# Patient Record
Sex: FEMALE | Race: WHITE | Employment: FULL TIME | URBAN - METROPOLITAN AREA
[De-identification: names, ages, dates, MRNs, and addresses within clinical notes are randomized per-mention and may not be internally consistent; named-entity substitution may affect disease eponyms.]

---

## 2017-05-25 ENCOUNTER — TELEPHONE (OUTPATIENT)
Dept: ONCOLOGY | Facility: CLINIC | Age: 67
End: 2017-05-25

## 2017-05-25 NOTE — TELEPHONE ENCOUNTER
SOCIAL WORK  [facility] referral sent for 6/18-21 along with caregiver exemption -  Chelsea Ruiz, [facility]  [phone]

## 2017-10-03 ENCOUNTER — ONCOLOGY VISIT (OUTPATIENT)
Dept: ONCOLOGY | Facility: CLINIC | Age: 67
End: 2017-10-03
Attending: INTERNAL MEDICINE
Payer: COMMERCIAL

## 2017-10-03 VITALS
RESPIRATION RATE: 18 BRPM | OXYGEN SATURATION: 96 % | SYSTOLIC BLOOD PRESSURE: 115 MMHG | DIASTOLIC BLOOD PRESSURE: 78 MMHG | BODY MASS INDEX: 27.25 KG/M2 | WEIGHT: 156.31 LBS | HEART RATE: 78 BPM | TEMPERATURE: 98 F

## 2017-10-03 DIAGNOSIS — C49.9 SARCOMA (H): ICD-10-CM

## 2017-10-03 DIAGNOSIS — R91.8 PULMONARY NODULES: Primary | ICD-10-CM

## 2017-10-03 DIAGNOSIS — F43.21 ADJUSTMENT DISORDER WITH DEPRESSED MOOD: ICD-10-CM

## 2017-10-03 DIAGNOSIS — J44.9 CHRONIC OBSTRUCTIVE PULMONARY DISEASE, UNSPECIFIED COPD TYPE (H): ICD-10-CM

## 2017-10-03 PROCEDURE — 99214 OFFICE O/P EST MOD 30 MIN: CPT | Mod: GC | Performed by: INTERNAL MEDICINE

## 2017-10-03 PROCEDURE — 99212 OFFICE O/P EST SF 10 MIN: CPT | Mod: ZF

## 2017-10-03 RX ORDER — AMOXICILLIN AND CLAVULANATE POTASSIUM 500; 125 MG/1; MG/1
1 TABLET, FILM COATED ORAL 2 TIMES DAILY
Qty: 20 TABLET | Refills: 0 | Status: SHIPPED | OUTPATIENT
Start: 2017-10-03

## 2017-10-03 ASSESSMENT — PAIN SCALES - GENERAL: PAINLEVEL: NO PAIN (0)

## 2017-10-03 NOTE — LETTER
10/3/2017       RE: Apolonia Sánchez  [address] [place] [id number]     Dear Colleague,    Thank you for referring your patient, Apolonia Sánchez, to the [facility]. Please see a copy of my visit note below.    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   -  Background   In brief, she was generally doing well until she noticed some discomfort in the right buttock probably about 09/2010. This was not very bothersome and she did not make much out of it. Later, however, she had a fairly rapid onset of significant pain there and felt she noticed a lump and sought medical attention. 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   She began pre-operative chemotherapy w/ doxil/ifos on 3-4-11.   She had 4 cycles as an inpatient and the doxil dose was reduced due to skin toxiticy.   She was admitted with a tooth infection after the last (3rd) cycle.   Surgery was June 2011 and about 5% of remaining tumor was viable.  She finished XRT in Sept 2011.  -    Interval history:    Overall, Ms Sánchez reports feeling well. She has recently noted some increased cough with sputum production. She has not had any fevers, increased dyspnea, chest pain or other symptoms.   She able to walk up/down a floor of stairs without stopping but has to rest at the top; she uses a combivent inhaler and regulates her PCP regarding.    She continues to work as an  at a care facility.  She had a CXR completed yesterday (10/2/2017). This showed a suspicious nodule in the RUL. Further evaluation with a CT scan was recommended.     Her son passed away in 2015 while on hospice and then her daughter-in-law's mother suddenly passed away from an aneurysm, as a result.  We discussed this again today as it remains a stress.  Her energy level is good.  Appetite is fine.    She thinks her mood is usually OK on current zoloft of 100 and feels better than last  year; she finds working is helpful for her mood.  She denies any changes in her medications.  Her 10-point review of systems is otherwise unremarkable.      -  Background PMH, FH, SH   PMH unremarkable other than foot surgery in 1994 and a three normal deliveries.   FH notable for breast cancer in her mother and heart disease in both parents.   She denies any drug allergies.   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 She stopped smoking a few years ago having smoked from ages 20 - 60 and still has an occasional cigarette. She does not abuse alcohol or other drugs.   -  On exam she appeared comfortable.  /78  Pulse 78  Temp 98  F (36.7  C) (Oral)  Resp 18  Wt 70.9 kg (156 lb 4.9 oz)  SpO2 96%  BMI 27.25 kg/m2  Gen NAD  HEENT 3 mm mobile soft nodule right chin, nontender cyst (this was removed but not all). No icterus  NECK No thyromegaly.  CV RRR  Pulm dec BS throughout. No wheeze  LYMPH No lymphadenopathy  Abd soft, ntnd, nl BS, no mass  Ext no edema  Skin well healed incision R buttock/thigh. No rashes/lesions noted  Romberg, heel/toe walk normal.  Mood good.    -  No labs.  -  Imaging:  CXR demonstrated nodule in the RUL. A follow up chest CT was ordered today. We reviewed the images. The official read has not yet returned. However, there appears to be a spiculated nodule measuring just over 1 cm in the RUL. This could represent infection vs primary lung malignancy vs metastatic sarcoma.   -    We reviewed the results from the CXR and ordered a chest CT. She returned later in the day following her CT scan. At this time, we cannot be sure as to the etiology of the nodule. She has been having some increased cough with sputum production recently. Thus, it is possible that this could be infectious. However, this may also be either primary malignancy or metastatic disease.   We will start augmentin. She will return for CT scan in 1 month with follow up.     John Hoffman MD  Resident,  Radiation Oncology     I examined the patient w Dr. Hoffman and agree w the above note.    Aren Drew M.D.  Professor  Hematology, Oncology and Transplantation

## 2017-10-03 NOTE — MR AVS SNAPSHOT
After Visit Summary   10/3/2017    Apolonia Sánchez    MRN: [id number]           Patient Information     Date Of Birth          1950        Visit Information        Provider Department      10/3/2017 8:30 AM Aren Drew MD [facility]        Today's Diagnoses     Pulmonary nodules    -  1    Sarcoma (H)        Adjustment disorder with depressed mood        Chronic obstructive pulmonary disease, unspecified COPD type (H)           Follow-ups after your visit        Your next 10 appointments already scheduled     Oct 31, 2017  1:00 PM CDT   (Arrive by 12:45 PM)   CT CHEST W/O CONTRAST with UCCT2   [facility] ([facility])    [address] [place] [id number] [phone]           Please bring any scans or X-rays taken at other hospitals, if similar tests were done. Also bring a list of your medicines, including vitamins, minerals and over-the-counter drugs. It is safest to leave personal items at home.  Be sure to tell your doctor:   If you have any allergies.   If there s any chance you are pregnant.   If you are breastfeeding.   If you have any special needs.  You do not need to do anything special to prepare.  Please wear loose clothing, such as a sweat suit or jogging clothes. Avoid snaps, zippers and other metal. We may ask you to undress and put on a hospital gown.            Nov 01, 2017 12:30 PM CDT   (Arrive by 12:15 PM)   Return Visit with Aren Drew MD   [facility] ([facility])    [address] [place] [id number] [phone]              Future tests that were ordered for you today     Open Future Orders        Priority Expected Expires Ordered    CT Chest w/o Contrast Routine 10/31/2017 1/1/2018 10/3/2017            Who to contact     If you have questions or need follow up information about today's clinic  visit or your schedule please contact [facility] directly at [phone].  Normal or non-critical lab and imaging results will be communicated to you by MyChart, letter or phone within 4 business days after the clinic has received the results. If you do not hear from us within 7 days, please contact the clinic through [facility] or phone. If you have a critical or abnormal lab result, we will notify you by phone as soon as possible.  Submit refill requests through [employer] or call your pharmacy and they will forward the refill request to us. Please allow 3 business days for your refill to be completed.          Additional Information About Your Visit        [employer] Information     [employer] gives you secure access to your electronic health record. If you see a primary care provider, you can also send messages to your care team and make appointments. If you have questions, please call your primary care clinic.  If you do not have a primary care provider, please call [phone] and they will assist you.        Care EveryWhere ID     This is your Care EveryWhere ID. This could be used by other organizations to access your [facility] medical records  [id number]        Your Vitals Were     Pulse Temperature Respirations Pulse Oximetry BMI (Body Mass Index)       78 98  F (36.7  C) (Oral) 18 96% 27.25 kg/m2        Blood Pressure from Last 3 Encounters:   10/03/17 115/78   06/21/16 110/81   01/13/15 117/81    Weight from Last 3 Encounters:   10/03/17 70.9 kg (156 lb 4.9 oz)   06/21/16 71.7 kg (158 lb)   01/13/15 70.9 kg (156 lb 4.9 oz)                 Today's Medication Changes          These changes are accurate as of: 10/3/17  9:56 AM.  If you have any questions, ask your nurse or doctor.               Start taking these medicines.        Dose/Directions    amoxicillin-clavulanate 500-125 MG per tablet   Commonly known as:  AUGMENTIN   Used for:  Pulmonary nodules, Chronic obstructive pulmonary disease,  unspecified COPD type (H), Sarcoma (H), Adjustment disorder with depressed mood   Started by:  Aren Drew MD        Dose:  1 tablet   Take 1 tablet by mouth 2 times daily   Quantity:  20 tablet   Refills:  0         Stop taking these medicines if you haven't already. Please contact your care team if you have questions.     aspirin 81 MG tablet   Stopped by:  Aren Drew MD           LEVOFLOXACIN PO   Stopped by:  Aren Drew MD                Where to get your medicines      These medications were sent to [facility] [id number] [place] [address] [address] AT [facility] [address]  [address], [facility] [address]     Phone:  [phone]     amoxicillin-clavulanate 500-125 MG per tablet                Primary Care Provider Office Phone # Fax #    Regina Dia [phone]       [address] [place] [address]        Equal Access to Services     [facility]: Hadii aad ku hadasho Soomaali, waaxda luqadaha, qaybta kaalmada adeegyada, waxay idiin haylenka stephenson . So [facility] [phone].    ATENCIÓN: Si habla español, tiene a bailey disposición servicios gratuitos de asistencia lingüística. [facility] [phone].    We comply with applicable federal civil rights laws and [place] laws. We do not discriminate on the basis of race, color, national origin, age, disability, sex, sexual orientation, or gender identity.            Thank you!     Thank you for choosing [facility]  for your care. Our goal is always to provide you with excellent care. Hearing back from our patients is one way we can continue to improve our services. Please take a few minutes to complete the written survey that you may receive in the mail after your visit with us. Thank you!             Your Updated Medication List - Protect others around you: Learn how to safely use, store and throw away your  medicines at www.disposemymeds.org.          This list is accurate as of: 10/3/17  9:56 AM.  Always use your most recent med list.                   Brand Name Dispense Instructions for use Diagnosis    amoxicillin-clavulanate 500-125 MG per tablet    AUGMENTIN    20 tablet    Take 1 tablet by mouth 2 times daily    Pulmonary nodules, Chronic obstructive pulmonary disease, unspecified COPD type (H), Sarcoma (H), Adjustment disorder with depressed mood       COMBIVENT RESPIMAT  MCG/ACT inhaler   Generic drug:  Ipratropium-Albuterol      Inhale 1 puff into the lungs as needed for shortness of breath / dyspnea or wheezing    Sarcoma (H)       LIPITOR PO      Take 1 tablet by mouth daily.        LISINOPRIL PO      Take 12.5 mg by mouth    Sarcoma (H), Adjustment disorder with depressed mood       PRAVASTATIN SODIUM PO      Take 80 mg by mouth    Sarcoma (H), Adjustment disorder with depressed mood       sertraline 100 MG tablet    ZOLOFT     Take 100 mg by mouth daily.        TYLENOL PM EXTRA STRENGTH PO      Take  by mouth. Take 1 tab at bedtime

## 2017-10-03 NOTE — PROGRESS NOTES
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   -  Background   In brief, she was generally doing well until she noticed some discomfort in the right buttock probably about 09/2010. This was not very bothersome and she did not make much out of it. Later, however, she had a fairly rapid onset of significant pain there and felt she noticed a lump and sought medical attention. 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   She began pre-operative chemotherapy w/ doxil/ifos on 3-4-11.   She had 4 cycles as an inpatient and the doxil dose was reduced due to skin toxiticy.   She was admitted with a tooth infection after the last (3rd) cycle.   Surgery was June 2011 and about 5% of remaining tumor was viable.  She finished XRT in Sept 2011.  -    Interval history:    Overall, Ms Sánchez reports feeling well. She has recently noted some increased cough with sputum production. She has not had any fevers, increased dyspnea, chest pain or other symptoms.   She able to walk up/down a floor of stairs without stopping but has to rest at the top; she uses a combivent inhaler and regulates her PCP regarding.    She continues to work as an  at a care facility.  She had a CXR completed yesterday (10/2/2017). This showed a suspicious nodule in the RUL. Further evaluation with a CT scan was recommended.     Her son passed away in 2015 while on hospice and then her daughter-in-law's mother suddenly passed away from an aneurysm, as a result.  We discussed this again today as it remains a stress.  Her energy level is good.  Appetite is fine.    She thinks her mood is usually OK on current zoloft of 100 and feels better than last year; she finds working is helpful for her mood.  She denies any changes in her medications.  Her 10-point review of systems is otherwise unremarkable.      -  Background PMH, FH, SH   PMH unremarkable other than foot surgery in 1994  and a three normal deliveries.   FH notable for breast cancer in her mother and heart disease in both parents.   She denies any drug allergies.   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 She stopped smoking a few years ago having smoked from ages 20 - 60 and still has an occasional cigarette. She does not abuse alcohol or other drugs.   -  On exam she appeared comfortable.  /78  Pulse 78  Temp 98  F (36.7  C) (Oral)  Resp 18  Wt 70.9 kg (156 lb 4.9 oz)  SpO2 96%  BMI 27.25 kg/m2  Gen NAD  HEENT 3 mm mobile soft nodule right chin, nontender cyst (this was removed but not all). No icterus  NECK No thyromegaly.  CV RRR  Pulm dec BS throughout. No wheeze  LYMPH No lymphadenopathy  Abd soft, ntnd, nl BS, no mass  Ext no edema  Skin well healed incision R buttock/thigh. No rashes/lesions noted  Romberg, heel/toe walk normal.  Mood good.    -  No labs.  -  Imaging:  CXR demonstrated nodule in the RUL. A follow up chest CT was ordered today. We reviewed the images. The official read has not yet returned. However, there appears to be a spiculated nodule measuring just over 1 cm in the RUL. This could represent infection vs primary lung malignancy vs metastatic sarcoma.   -    We reviewed the results from the CXR and ordered a chest CT. She returned later in the day following her CT scan. At this time, we cannot be sure as to the etiology of the nodule. She has been having some increased cough with sputum production recently. Thus, it is possible that this could be infectious. However, this may also be either primary malignancy or metastatic disease.   We will start augmentin. She will return for CT scan in 1 month with follow up.     John Hoffman MD  Resident, Radiation Oncology     I examined the patient w Dr. Hoffman and agree w the above note.    Aren Drew M.D.  Professor  Hematology, Oncology and Transplantation

## 2017-10-03 NOTE — NURSING NOTE
"Oncology Rooming Note    October 3, 2017 8:17 AM   Apolonia Sánchez is a 66 year old female who presents for:    Chief Complaint   Patient presents with     Oncology Clinic Visit     Return for Sarcoma      Initial Vitals: /78  Pulse 78  Temp 98  F (36.7  C) (Oral)  Resp 18  Wt 70.9 kg (156 lb 4.9 oz)  SpO2 96%  BMI 27.25 kg/m2 Estimated body mass index is 27.25 kg/(m^2) as calculated from the following:    Height as of 8/12/14: 1.613 m (5' 3.5\").    Weight as of this encounter: 70.9 kg (156 lb 4.9 oz). Body surface area is 1.78 meters squared.  No Pain (0) Comment: Data Unavailable   No LMP recorded. Patient is postmenopausal.  Allergies reviewed: Yes  Medications reviewed: Yes    Medications: Medication refills not needed today.  Pharmacy name entered into [facility]:    [facility] - [place] [address] AT [facility] [address]  [facility] - [place], [place] - [address] [place] [address]    Clinical concerns: results  Viki was notified.    8 minutes for nursing intake (face to face time)     Sally Hanley MA              "

## 2017-11-01 ENCOUNTER — ONCOLOGY VISIT (OUTPATIENT)
Dept: ONCOLOGY | Facility: CLINIC | Age: 67
End: 2017-11-01
Attending: INTERNAL MEDICINE
Payer: COMMERCIAL

## 2017-11-01 VITALS
SYSTOLIC BLOOD PRESSURE: 124 MMHG | HEIGHT: 64 IN | OXYGEN SATURATION: 95 % | HEART RATE: 96 BPM | DIASTOLIC BLOOD PRESSURE: 84 MMHG | RESPIRATION RATE: 18 BRPM | TEMPERATURE: 97.3 F | BODY MASS INDEX: 26.8 KG/M2 | WEIGHT: 157 LBS

## 2017-11-01 DIAGNOSIS — J98.4 DISEASE OF LUNG: ICD-10-CM

## 2017-11-01 DIAGNOSIS — C49.9 SARCOMA (H): ICD-10-CM

## 2017-11-01 DIAGNOSIS — R91.8 PULMONARY NODULES: ICD-10-CM

## 2017-11-01 DIAGNOSIS — F43.21 ADJUSTMENT DISORDER WITH DEPRESSED MOOD: ICD-10-CM

## 2017-11-01 DIAGNOSIS — J44.9 CHRONIC OBSTRUCTIVE PULMONARY DISEASE, UNSPECIFIED COPD TYPE (H): ICD-10-CM

## 2017-11-01 PROCEDURE — 99213 OFFICE O/P EST LOW 20 MIN: CPT | Mod: ZF

## 2017-11-01 PROCEDURE — 99214 OFFICE O/P EST MOD 30 MIN: CPT | Mod: ZP | Performed by: INTERNAL MEDICINE

## 2017-11-01 ASSESSMENT — PAIN SCALES - GENERAL: PAINLEVEL: NO PAIN (0)

## 2017-11-01 NOTE — LETTER
11/1/2017       RE: Apolonia Sánchez  [address] [place] [id number]     Dear Colleague,    Thank you for referring your patient, Apolonia Sánchez, to the [facility]. Please see a copy of my visit note below.    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   -  Background   In brief, she was generally doing well until she noticed some discomfort in the right buttock probably about 09/2010. This was not very bothersome and she did not make much out of it. Later, however, she had a fairly rapid onset of significant pain there and felt she noticed a lump and sought medical attention. 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   She began pre-operative chemotherapy w/ doxil/ifos on 3-4-11.   She had 4 cycles as an inpatient and the doxil dose was reduced due to skin toxiticy.   She was admitted with a tooth infection after the last (3rd) cycle.   Surgery was June 2011 and about 5% of remaining tumor was viable.  She finished XRT in Sept 2011.  -     Interval history:     Overall, Ms Sánchez reports feeling well. She took some augmentin and the cough has resolved. She has not had any fevers, increased dyspnea, chest pain or other symptoms.   She able to walk up/down a floor of stairs without stopping but has to rest at the top; she uses a combivent inhaler per her PCP.    She continues to work as an  at a care facility.    The CT a month ago showed a suspicious nodule in the RUL.      Her son passed away in 2015 while on hospice and then her daughter-in-law's mother suddenly passed away from an aneurysm, as a result.  We discussed this again today as it remains a stress.     She thinks her mood is usually OK on current zoloft of 100 and feels better than last year; she finds working is helpful for her mood.  She denies any changes in her medications.    Her 10-point review of systems is otherwise unremarkable.  "      -  Background PMH, FH, SH   PMH unremarkable other than foot surgery in 1994 and a three normal deliveries.   FH notable for breast cancer in her mother and heart disease in both parents.   She denies any drug allergies.   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 She stopped smoking a few years ago having smoked from ages 20 - 60 and still has an occasional cigarette. She does not abuse alcohol or other drugs.   -  On exam she appeared comfortable.  /84  Pulse 96  Temp 97.3  F (36.3  C) (Oral)  Resp 18  Ht 1.613 m (5' 3.5\")  Wt 71.2 kg (157 lb)  SpO2 95%  BMI 27.37 kg/m2  HEENT  No icterus  NECK No thyromegaly.  CV RRR  CHEST dec BS throughout. No wheeze  ABD soft, ntnd, nl BS, no mass  Ext no edema  LYMPH No lymphadenopathy  SKIN well healed incision R buttock/thigh. No rashes/lesions noted  NEURO Romberg, heel/toe walk normal.  Mood anxious today.     -  No labs.  -  I reviewed the new images. I reviewed the images w Dr Vigil and the spiculated nodule measuring just over 1 cm in the RUL. This could represent infection vs primary lung malignancy vs metastatic sarcoma. It us unchanged over the last month.  -     We discussed that we cannot be sure as to the etiology of the nodule. We will see her again about 2-1 with a CT. She will call ifother questions arise.     Aren Drew M.D.  Professor  Hematology, Oncology and Transplantation      "

## 2017-11-01 NOTE — NURSING NOTE
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    Weight as of this encounter: 71.2 kg (157 lb). Body surface area is 1.79 meters squared.  No Pain (0) Comment: Data Unavailable   No LMP recorded. Patient is postmenopausal.  Allergies reviewed: Yes  Medications reviewed: Yes    Medications: Medication refills not needed today.  Pharmacy name entered into [facility]:    [facility] - [place] [address] AT [facility] [address] [facility] - [place], [place] - [address] [facility]    Clinical concerns: No new concerns. Provider was notified.    10 minutes for nursing intake (face to face time)     Autumn Quezada LPN            "

## 2017-11-01 NOTE — MR AVS SNAPSHOT
After Visit Summary   11/1/2017    Apolonia Sánchez    MRN: [id number]           Patient Information     Date Of Birth          1950        Visit Information        Provider Department      11/1/2017 12:30 PM Aren Drew MD [facility]        Today's Diagnoses     Sarcoma (H)        Disease of lung        Adjustment disorder with depressed mood        Chronic obstructive pulmonary disease, unspecified COPD type (H)        Pulmonary nodules           Follow-ups after your visit        Future tests that were ordered for you today     Open Future Orders        Priority Expected Expires Ordered    CT Chest w/o Contrast Routine 1/29/2018 6/30/2018 11/1/2017            Who to contact     If you have questions or need follow up information about today's clinic visit or your schedule please contact [facility] directly at [phone].  Normal or non-critical lab and imaging results will be communicated to you by Kaskadohart, letter or phone within 4 business days after the clinic has received the results. If you do not hear from us within 7 days, please contact the clinic through Kaskadohart or phone. If you have a critical or abnormal lab result, we will notify you by phone as soon as possible.  Submit refill requests through [employer] or call your pharmacy and they will forward the refill request to us. Please allow 3 business days for your refill to be completed.          Additional Information About Your Visit        Kaskadohart Information     [employer] gives you secure access to your electronic health record. If you see a primary care provider, you can also send messages to your care team and make appointments. If you have questions, please call your primary care clinic.  If you do not have a primary care provider, please call [phone] and they will assist you.        Care EveryWhere ID     This is your Care EveryWhere ID. This could be used by other  "organizations to access your [place] medical records  [id number]        Your Vitals Were     Pulse Temperature Respirations Height Pulse Oximetry BMI (Body Mass Index)    96 97.3  F (36.3  C) (Oral) 18 1.613 m (5' 3.5\") 95% 27.37 kg/m2       Blood Pressure from Last 3 Encounters:   11/01/17 124/84   10/03/17 115/78   06/21/16 110/81    Weight from Last 3 Encounters:   11/01/17 71.2 kg (157 lb)   10/03/17 70.9 kg (156 lb 4.9 oz)   06/21/16 71.7 kg (158 lb)               Primary Care Provider Office Phone # Fax #    Regina Dia [phone]       [address] [place] [address]        Equal Access to Services     BRI LAY : Hadii aad ku hadashsophy Sosam, waaxda luqadaha, qaybta kaalmada adeegyada, saad stephenson . So [facility] [phone].    ATENCIÓN: Si habla español, tiene a bailey disposición servicios gratuitos de asistencia lingüística. Yungame al [phone].    We comply with applicable federal civil rights laws and [place] laws. We do not discriminate on the basis of race, color, national origin, age, disability, sex, sexual orientation, or gender identity.            Thank you!     Thank you for choosing [facility]  for your care. Our goal is always to provide you with excellent care. Hearing back from our patients is one way we can continue to improve our services. Please take a few minutes to complete the written survey that you may receive in the mail after your visit with us. Thank you!             Your Updated Medication List - Protect others around you: Learn how to safely use, store and throw away your medicines at www.disposemymeds.org.          This list is accurate as of: 11/1/17 12:49 PM.  Always use your most recent med list.                   Brand Name Dispense Instructions for use Diagnosis    amoxicillin-clavulanate 500-125 MG per tablet    AUGMENTIN    20 tablet    Take 1 tablet by mouth 2 " times daily    Pulmonary nodules, Chronic obstructive pulmonary disease, unspecified COPD type (H), Sarcoma (H), Adjustment disorder with depressed mood       COMBIVENT RESPIMAT  MCG/ACT inhaler   Generic drug:  Ipratropium-Albuterol      Inhale 1 puff into the lungs as needed for shortness of breath / dyspnea or wheezing    Sarcoma (H)       LIPITOR PO      Take 1 tablet by mouth daily.        LISINOPRIL PO      Take 12.5 mg by mouth    Sarcoma (H), Adjustment disorder with depressed mood       PRAVASTATIN SODIUM PO      Take 80 mg by mouth    Sarcoma (H), Adjustment disorder with depressed mood       sertraline 100 MG tablet    ZOLOFT     Take 100 mg by mouth daily.        TYLENOL PM EXTRA STRENGTH PO      Take  by mouth. Take 1 tab at bedtime

## 2017-11-01 NOTE — PROGRESS NOTES
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   -  Background   In brief, she was generally doing well until she noticed some discomfort in the right buttock probably about 09/2010. This was not very bothersome and she did not make much out of it. Later, however, she had a fairly rapid onset of significant pain there and felt she noticed a lump and sought medical attention. 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   She began pre-operative chemotherapy w/ doxil/ifos on 3-4-11.   She had 4 cycles as an inpatient and the doxil dose was reduced due to skin toxiticy.   She was admitted with a tooth infection after the last (3rd) cycle.   Surgery was June 2011 and about 5% of remaining tumor was viable.  She finished XRT in Sept 2011.  -     Interval history:     Overall, Ms Sánchez reports feeling well. She took some augmentin and the cough has resolved. She has not had any fevers, increased dyspnea, chest pain or other symptoms.   She able to walk up/down a floor of stairs without stopping but has to rest at the top; she uses a combivent inhaler per her PCP.    She continues to work as an  at a care facility.    The CT a month ago showed a suspicious nodule in the RUL.      Her son passed away in 2015 while on hospice and then her daughter-in-law's mother suddenly passed away from an aneurysm, as a result.  We discussed this again today as it remains a stress.     She thinks her mood is usually OK on current zoloft of 100 and feels better than last year; she finds working is helpful for her mood.  She denies any changes in her medications.    Her 10-point review of systems is otherwise unremarkable.       -  Background PMH, FH, SH   PMH unremarkable other than foot surgery in 1994 and a three normal deliveries.   FH notable for breast cancer in her mother and heart disease in both parents.   She denies any drug allergies.   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 She stopped smoking a few years ago having smoked from ages 20 - 60 and still has an occasional cigarette. She does not abuse alcohol or other drugs.   -  On exam she appeared comfortable.  /84  Pulse 96  Temp 97.3  F (36.3  C) (Oral)  Resp 18  Ht 1.613 m (5' 3.5\")  Wt 71.2 kg (157 lb)  SpO2 95%  BMI 27.37 kg/m2  HEENT  No icterus  NECK No thyromegaly.  CV RRR  CHEST dec BS throughout. No wheeze  ABD soft, ntnd, nl BS, no mass  Ext no edema  LYMPH No lymphadenopathy  SKIN well healed incision R buttock/thigh. No rashes/lesions noted  NEURO Romberg, heel/toe walk normal.  Mood anxious today.     -  No labs.  -  I reviewed the new images. I reviewed the images w Dr Vigil and the spiculated nodule measuring just over 1 cm in the RUL. This could represent infection vs primary lung malignancy vs metastatic sarcoma. It us unchanged over the last month.  -     We discussed that we cannot be sure as to the etiology of the nodule. We will see her again about 2-1 with a CT. She will call ifother questions arise.     Aren Drew M.D.  Professor  Hematology, Oncology and Transplantation  "

## 2017-11-15 ENCOUNTER — TELEPHONE (OUTPATIENT)
Dept: TRANSPLANT | Facility: CLINIC | Age: 67
End: 2017-11-15

## 2017-11-15 NOTE — TELEPHONE ENCOUNTER
"Oncology Distress Screening Follow-up  Clinical Social Work  [facility]    Identified Concern and Score From Distress Screening: On 2017, pt scored a 10/10 on the Oncology Distress Screening Question \"How concerned are you about feeling anxious or very scared?\"    Date of Distress Screenin2017    Data: Pt is a 67 year old female with sarcoma of the right hip.    Intervention: On 11/15/2017, SW followed up with pt (P: [phone]) to discuss concern. SW left a voice message introducing SW self/role. SW provided Oncology SW contact information and encouraged pt to call back for any support/resources needed.    Education Provided: Oncology Social Work Contact Information provided for pt to call back for support/resources.    Follow-up Required: None needed at this time.     DONY Perla, LGSW  Phone: [phone]  Pager: [phone]  "

## 2018-04-23 ENCOUNTER — RADIANT APPOINTMENT (OUTPATIENT)
Dept: CT IMAGING | Facility: CLINIC | Age: 68
End: 2018-04-23
Attending: INTERNAL MEDICINE
Payer: COMMERCIAL

## 2018-04-23 DIAGNOSIS — C49.9 SARCOMA (H): ICD-10-CM

## 2018-04-23 DIAGNOSIS — J98.4 DISEASE OF LUNG: ICD-10-CM

## 2018-04-23 DIAGNOSIS — J44.9 CHRONIC OBSTRUCTIVE PULMONARY DISEASE, UNSPECIFIED COPD TYPE (H): ICD-10-CM

## 2018-04-23 DIAGNOSIS — R91.8 PULMONARY NODULES: ICD-10-CM

## 2018-04-23 DIAGNOSIS — F43.21 ADJUSTMENT DISORDER WITH DEPRESSED MOOD: ICD-10-CM

## 2018-04-23 NOTE — PROGRESS NOTES
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   -  Background   In brief, she was generally doing well until she noticed some discomfort in the right buttock probably about 09/2010. This was not very bothersome and she did not make much out of it. Later, however, she had a fairly rapid onset of significant pain there and felt she noticed a lump and sought medical attention. 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   She began pre-operative chemotherapy w/ doxil/ifos on 3-4-11.   She had 4 cycles as an inpatient and the doxil dose was reduced due to skin toxiticy.   She was admitted with a tooth infection after the last (3rd) cycle.   Surgery was June 2011 and about 5% of remaining tumor was viable.  She finished XRT in Sept 2011.  -      Interval history:      Overall, Ms Sánchez reports feeling generally OK though she gets tired and has the COPD.  She saw a lung doctor in December due to breathing problems.  This is better now but she still has MEZA on 1 floor. She is on new inhalers.  She wakes up with a dry mouth.    She continues to work as an  at a care facility.    Her son passed away in 2015 while on hospice and then her daughter-in-law's mother suddenly passed away from an aneurysm, as a result. This remains a stress.      She thinks her mood is usually OK on current zoloft of 100 and feels better than last year; she finds working is helpful for her mood.    Her 10-point review of systems is otherwise unremarkable.        -  Background PMH, FH,    PMH unremarkable other than foot surgery in 1994 and a three normal deliveries.   FH notable for breast cancer in her mother and heart disease in both parents.   She denies any drug allergies.   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 She stopped smoking a few years ago having smoked from ages 20 - 60 and still has an occasional cigarette. She does not abuse  "alcohol or other drugs.   -  On exam she appeared comfortable.  /77  Pulse 82  Temp 97.6  F (36.4  C) (Oral)  Resp 16  Ht 1.613 m (5' 3.5\")  Wt 71.4 kg (157 lb 4.8 oz)  SpO2 97%  BMI 27.42 kg/m2  HEENT  No icterus  NECK No thyromegaly or mass.  CHEST dec BS throughout. No wheeze  CV RRR w occ skipped beat.  LYMPH No lymphadenopathy  ABD soft, ntnd, nl BS, no mass  EXT no edema  SKIN well healed incision R buttock/thigh.   NEURO Romberg, heel/toe walk normal.  Mood OK.    -  No labs.  -  I reviewed the new images and the 0.8 cm nodule in the RUL see on the Oct scan is smaller now.     -    We discussed the situation and now we can go back to our yearly interval. We will see her again about 3-21 with a CT. She will call ifother questions arise.      Aren Drew M.D.  Professor  Hematology, Oncology and Transplantation  "

## 2018-04-24 ENCOUNTER — ONCOLOGY VISIT (OUTPATIENT)
Dept: ONCOLOGY | Facility: CLINIC | Age: 68
End: 2018-04-24
Attending: INTERNAL MEDICINE
Payer: COMMERCIAL

## 2018-04-24 VITALS
RESPIRATION RATE: 16 BRPM | DIASTOLIC BLOOD PRESSURE: 77 MMHG | TEMPERATURE: 97.6 F | HEART RATE: 82 BPM | HEIGHT: 64 IN | WEIGHT: 157.3 LBS | OXYGEN SATURATION: 97 % | SYSTOLIC BLOOD PRESSURE: 126 MMHG | BODY MASS INDEX: 26.85 KG/M2

## 2018-04-24 DIAGNOSIS — J43.9 PULMONARY EMPHYSEMA, UNSPECIFIED EMPHYSEMA TYPE (H): ICD-10-CM

## 2018-04-24 DIAGNOSIS — R91.8 PULMONARY NODULES: ICD-10-CM

## 2018-04-24 DIAGNOSIS — F43.21 ADJUSTMENT DISORDER WITH DEPRESSED MOOD: ICD-10-CM

## 2018-04-24 DIAGNOSIS — C49.9 SARCOMA (H): ICD-10-CM

## 2018-04-24 DIAGNOSIS — J98.4 DISEASE OF LUNG: ICD-10-CM

## 2018-04-24 PROCEDURE — G0463 HOSPITAL OUTPT CLINIC VISIT: HCPCS | Mod: ZF

## 2018-04-24 PROCEDURE — 99214 OFFICE O/P EST MOD 30 MIN: CPT | Mod: ZP | Performed by: INTERNAL MEDICINE

## 2018-04-24 RX ORDER — LISINOPRIL/HYDROCHLOROTHIAZIDE 10-12.5 MG
1 TABLET ORAL
COMMUNITY
Start: 2018-01-31

## 2018-04-24 ASSESSMENT — PAIN SCALES - GENERAL: PAINLEVEL: NO PAIN (0)

## 2018-04-24 NOTE — NURSING NOTE
"Oncology Rooming Note    April 24, 2018 8:09 AM   Apolonia Sánchez is a 67 year old female who presents for:    Chief Complaint   Patient presents with     Oncology Clinic Visit     F/U Sarcoma     Initial Vitals: There were no vitals taken for this visit. Estimated body mass index is 27.37 kg/(m^2) as calculated from the following:    Height as of 11/1/17: 1.613 m (5' 3.5\").    Weight as of 11/1/17: 71.2 kg (157 lb). There is no height or weight on file to calculate BSA.  Data Unavailable Comment: Data Unavailable   No LMP recorded. Patient is postmenopausal.  Allergies reviewed: Yes  Medications reviewed: Yes    Medications: Medication refills not needed today.  Pharmacy name entered into Appstarter:    [facility] [place] [address] AT [facility] [address] [employer]  [facility] [place], [place] - [address] [place] [address]    Clinical concerns: none Dr Drew was NOT notified.    10 minutes for nursing intake (face to face time)     MADDISON ALEJANDRO LPN            "

## 2018-04-24 NOTE — LETTER
4/24/2018       RE: Apolonia Sánchez  [address] [place] [id number]     Dear Colleague,    Thank you for referring your patient, Apolonia Sánchez, to the [facility]. Please see a copy of my visit note below.    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   -  Background   In brief, she was generally doing well until she noticed some discomfort in the right buttock probably about 09/2010. This was not very bothersome and she did not make much out of it. Later, however, she had a fairly rapid onset of significant pain there and felt she noticed a lump and sought medical attention. 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   She began pre-operative chemotherapy w/ doxil/ifos on 3-4-11.   She had 4 cycles as an inpatient and the doxil dose was reduced due to skin toxiticy.   She was admitted with a tooth infection after the last (3rd) cycle.   Surgery was June 2011 and about 5% of remaining tumor was viable.  She finished XRT in Sept 2011.  -      Interval history:      Overall, Ms Sánchez reports feeling generally OK though she gets tired and has the COPD.  She saw a lung doctor in December due to breathing problems.  This is better now but she still has MEZA on 1 floor. She is on new inhalers.  She wakes up with a dry mouth.    She continues to work as an  at a care facility.    Her son passed away in 2015 while on hospice and then her daughter-in-law's mother suddenly passed away from an aneurysm, as a result. This remains a stress.      She thinks her mood is usually OK on current zoloft of 100 and feels better than last year; she finds working is helpful for her mood.    Her 10-point review of systems is otherwise unremarkable.        -  Background PMH, FH, SH   PMH unremarkable other than foot surgery in 1994 and a three normal deliveries.   FH notable for breast cancer in her mother and heart disease in both  "parents.   She denies any drug allergies.   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 She stopped smoking a few years ago having smoked from ages 20 - 60 and still has an occasional cigarette. She does not abuse alcohol or other drugs.   -  On exam she appeared comfortable.  /77  Pulse 82  Temp 97.6  F (36.4  C) (Oral)  Resp 16  Ht 1.613 m (5' 3.5\")  Wt 71.4 kg (157 lb 4.8 oz)  SpO2 97%  BMI 27.42 kg/m2  HEENT  No icterus  NECK No thyromegaly or mass.  CHEST dec BS throughout. No wheeze  CV RRR w occ skipped beat.  LYMPH No lymphadenopathy  ABD soft, ntnd, nl BS, no mass  EXT no edema  SKIN well healed incision R buttock/thigh.   NEURO Romberg, heel/toe walk normal.  Mood OK.    -  No labs.  -  I reviewed the new images  and the 0.8 cm nodule in the RUL see on the Oct scan is smaller now.     -    We discussed the situation and now we can go back to our yearly interval. We will see her again about 3-21 with a CT. She will call ifother questions arise.      Aren Drew M.D.  Professor  Hematology, Oncology and Transplantation    "

## 2018-04-24 NOTE — MR AVS SNAPSHOT
After Visit Summary   4/24/2018    Apolonia Sánchez    MRN: [id number]           Patient Information     Date Of Birth          1950        Visit Information        Provider Department      4/24/2018 8:00 AM Aren Drew MD [facility] [place]        Today's Diagnoses     Sarcoma (H)        Disease of lung        Adjustment disorder with depressed mood        Pulmonary emphysema, unspecified emphysema type (H)        Pulmonary nodules           Follow-ups after your visit        Future tests that were ordered for you today     Open Future Orders        Priority Expected Expires Ordered    CT Chest w/o Contrast Routine 3/18/2019 4/19/2019 4/24/2018            Who to contact     If you have questions or need follow up information about today's clinic visit or your schedule please contact [facility] directly at [phone].  Normal or non-critical lab and imaging results will be communicated to you by MyChart, letter or phone within 4 business days after the clinic has received the results. If you do not hear from us within 7 days, please contact the clinic through Sqeeqeehart or phone. If you have a critical or abnormal lab result, we will notify you by phone as soon as possible.  Submit refill requests through [employer] or call your pharmacy and they will forward the refill request to us. Please allow 3 business days for your refill to be completed.          Additional Information About Your Visit        Sqeeqeehart Information     [employer] gives you secure access to your electronic health record. If you see a primary care provider, you can also send messages to your care team and make appointments. If you have questions, please call your primary care clinic.  If you do not have a primary care provider, please call [phone] and they will assist you.        Care EveryWhere ID     This is your Care EveryWhere ID. This could be used by other organizations to access  "your [facility] medical records  [id number]        Your Vitals Were     Pulse Temperature Respirations Height Pulse Oximetry BMI (Body Mass Index)    82 97.6  F (36.4  C) (Oral) 16 1.613 m (5' 3.5\") 97% 27.42 kg/m2       Blood Pressure from Last 3 Encounters:   04/24/18 126/77   11/01/17 124/84   10/03/17 115/78    Weight from Last 3 Encounters:   04/24/18 71.4 kg (157 lb 4.8 oz)   11/01/17 71.2 kg (157 lb)   10/03/17 70.9 kg (156 lb 4.9 oz)               Primary Care Provider Office Phone # Fax #    Regina Dia [phone]       [address] [place] [address]        Equal Access to Services     BRI LAY : Hadyanick fall Sosam, waaxda luqadaha, qaybta kaalmada adeoralia, saad stephenson . So [facility] [phone].    ATENCIÓN: Si habla español, tiene a bailey disposición servicios gratuitos de asistencia lingüística. Llame al [phone].    We comply with applicable federal civil rights laws and [place] laws. We do not discriminate on the basis of race, color, national origin, age, disability, sex, sexual orientation, or gender identity.            Thank you!     Thank you for choosing [facility]  for your care. Our goal is always to provide you with excellent care. Hearing back from our patients is one way we can continue to improve our services. Please take a few minutes to complete the written survey that you may receive in the mail after your visit with us. Thank you!             Your Updated Medication List - Protect others around you: Learn how to safely use, store and throw away your medicines at www.disposemymeds.org.          This list is accurate as of 4/24/18  8:28 AM.  Always use your most recent med list.                   Brand Name Dispense Instructions for use Diagnosis    albuterol 2 MG tablet    PROVENTIL     Take 2 mg by mouth    Sarcoma (H), Disease of lung, Adjustment disorder with " depressed mood, Pulmonary emphysema, unspecified emphysema type (H), Pulmonary nodules       amoxicillin-clavulanate 500-125 MG per tablet    AUGMENTIN    20 tablet    Take 1 tablet by mouth 2 times daily    Pulmonary nodules, Chronic obstructive pulmonary disease, unspecified COPD type (H), Sarcoma (H), Adjustment disorder with depressed mood       ANORO ELLIPTA 62.5-25 MCG/INH oral inhaler   Generic drug:  umeclidinium-vilanterol      Inhale 1 puff into the lungs daily    Sarcoma (H), Disease of lung, Adjustment disorder with depressed mood, Pulmonary emphysema, unspecified emphysema type (H), Pulmonary nodules       COMBIVENT RESPIMAT  MCG/ACT inhaler   Generic drug:  Ipratropium-Albuterol      Inhale 1 puff into the lungs as needed for shortness of breath / dyspnea or wheezing    Sarcoma (H)       INCRUSE ELLIPTA 62.5 MCG/INH oral inhaler   Generic drug:  umeclidinium      Inhale 1 puff into the lungs daily    Sarcoma (H), Disease of lung, Adjustment disorder with depressed mood, Pulmonary emphysema, unspecified emphysema type (H), Pulmonary nodules       LIPITOR PO      Take 1 tablet by mouth daily.        lisinopril-hydrochlorothiazide 10-12.5 MG per tablet    PRINZIDE/ZESTORETIC     Take 1 tablet by mouth    Sarcoma (H), Disease of lung, Adjustment disorder with depressed mood, Pulmonary emphysema, unspecified emphysema type (H), Pulmonary nodules       PRAVASTATIN SODIUM PO      Take 80 mg by mouth    Sarcoma (H), Adjustment disorder with depressed mood       sertraline 100 MG tablet    ZOLOFT     Take 100 mg by mouth daily.        tiotropium 2.5 MCG/ACT inhalation aerosol    SPIRIVA RESPIMAT     Inhale 1 puff into the lungs    Sarcoma (H), Disease of lung, Adjustment disorder with depressed mood, Pulmonary emphysema, unspecified emphysema type (H), Pulmonary nodules       TYLENOL PM EXTRA STRENGTH PO      Take  by mouth. Take 1 tab at bedtime        VITAMIN D-1000 MAX ST 1000 units Tabs   Generic  drug:  cholecalciferol      Take 1 tablet by mouth    Sarcoma (H), Disease of lung, Adjustment disorder with depressed mood, Pulmonary emphysema, unspecified emphysema type (H), Pulmonary nodules

## 2020-03-01 ENCOUNTER — HEALTH MAINTENANCE LETTER (OUTPATIENT)
Age: 70
End: 2020-03-01

## 2020-12-14 ENCOUNTER — HEALTH MAINTENANCE LETTER (OUTPATIENT)
Age: 70
End: 2020-12-14

## 2021-04-17 ENCOUNTER — HEALTH MAINTENANCE LETTER (OUTPATIENT)
Age: 71
End: 2021-04-17

## 2021-10-02 ENCOUNTER — HEALTH MAINTENANCE LETTER (OUTPATIENT)
Age: 71
End: 2021-10-02

## 2022-03-19 ENCOUNTER — HEALTH MAINTENANCE LETTER (OUTPATIENT)
Age: 72
End: 2022-03-19

## 2022-05-14 ENCOUNTER — HEALTH MAINTENANCE LETTER (OUTPATIENT)
Age: 72
End: 2022-05-14

## 2022-09-03 ENCOUNTER — HEALTH MAINTENANCE LETTER (OUTPATIENT)
Age: 72
End: 2022-09-03

## 2023-06-02 ENCOUNTER — HEALTH MAINTENANCE LETTER (OUTPATIENT)
Age: 73
End: 2023-06-02

## 2023-12-09 ENCOUNTER — HEALTH MAINTENANCE LETTER (OUTPATIENT)
Age: 73
End: 2023-12-09